# Patient Record
Sex: MALE | Race: WHITE
[De-identification: names, ages, dates, MRNs, and addresses within clinical notes are randomized per-mention and may not be internally consistent; named-entity substitution may affect disease eponyms.]

---

## 2020-06-27 ENCOUNTER — HOSPITAL ENCOUNTER (EMERGENCY)
Dept: HOSPITAL 97 - ER | Age: 20
Discharge: HOME | End: 2020-06-27
Payer: COMMERCIAL

## 2020-06-27 VITALS — DIASTOLIC BLOOD PRESSURE: 76 MMHG | SYSTOLIC BLOOD PRESSURE: 118 MMHG | OXYGEN SATURATION: 99 %

## 2020-06-27 VITALS — TEMPERATURE: 97.4 F
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
## 2020-06-27 DIAGNOSIS — S61.211A: Primary | ICD-10-CM
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
## 2020-06-27 DIAGNOSIS — Z23: ICD-10-CM

## 2020-06-27 PROCEDURE — 90471 IMMUNIZATION ADMIN: CPT

## 2020-06-27 PROCEDURE — 90714 TD VACC NO PRESV 7 YRS+ IM: CPT

## 2020-06-27 PROCEDURE — 99283 EMERGENCY DEPT VISIT LOW MDM: CPT

## 2020-06-27 PROCEDURE — 0JQK0ZZ REPAIR LEFT HAND SUBCUTANEOUS TISSUE AND FASCIA, OPEN APPROACH: ICD-10-PCS

## 2020-06-27 NOTE — XMS REPORT
Continuity of Care Document

                            Created on:2020



Patient:JACEY CORBIN

Sex:Male

:2000

External Reference #:[id number]





Demographics







                          Address                   [address] [place] [address]

 

                          Home Phone                [phone]

 

                          Email Address             NONE

 

                          Preferred Language        Unknown

 

                          Marital Status            Unknown

 

                          Yazidi Affiliation     Unknown

 

                          Race                      Unknown

 

                          Additional Race(s)        Unavailable

 

                          Ethnic Group              Unknown









Author







                          Organization              [facility]

t

 

                          Address                   [address] [place] [address] [place] [id number]

 

                          Phone                     [phone]









Care Team Providers







                    Name                Role                Phone

 

                    Unavailable         Unavailable         Unavailable









Problems

This patient has no known problems.



Allergies, Adverse Reactions, Alerts

This patient has no known allergies or adverse reactions.



Medications

This patient has no known medications.



Procedures

This patient has no known procedures.



Results

This patient has no known results.

## 2020-06-27 NOTE — ER
Nurse's Notes                                                                                     

 [facility]                                                                 

Name: Davon Gould                                                                              

Age: 20 yrs                                                                                       

Sex: Male                                                                                         

: 2000                                                                                   

MRN: [id number]                                                                                   

Arrival Date: 2020                                                                          

Time: 17:59                                                                                       

Account#: [id number]                                                                            

Bed 11                                                                                            

Private MD:                                                                                       

Diagnosis: Laceration without foreign body of left index finger without damage to nail            

                                                                                                  

Presentation:                                                                                     

                                                                                             

18:35 Chief complaint: Patient states: Cutting a corn, looked away, cut my 2nd digit of L     ca1 

      hand. Bleeding controlled. Coronavirus screen: Proceed with normal triage. Patient          

      denies a cough. Patient denies shortness of breath or difficulty breathing. Patient         

      denies measured and/or subjective temperature greater than 100.4F prior to today's          

      visit. Patient denies travel on a cruise ship or to a country the [facility] currently lists       

      as an affected area. Patient denies contact with known and/or suspected case of             

      COVID-19. Ebola Screen: Patient negative for fever greater than or equal to 101.5           

      degrees Fahrenheit, and additional compatible Ebola Virus Disease symptoms Patient          

      denies exposure to infectious person. Patient denies travel to an Ebola-affected area       

      in the 21 days before illness onset. No symptoms or risks identified at this time.          

      Initial Sepsis Screen: Does the patient meet any 2 criteria? No. Patient's initial          

      sepsis screen is negative. Does the patient have a suspected source of infection? No.       

      Patient's initial sepsis screen is negative. Risk Assessment: Do you want to hurt           

      yourself or someone else? Patient reports no desire to harm self or others. Onset of        

      symptoms was 2020.                                                                 

18:35 Method Of Arrival: Ambulatory                                                           ca1 

18:35 Acuity: CHERYL 4                                                                           ca1 

                                                                                                  

Historical:                                                                                       

- Allergies:                                                                                      

18:37 No Known Allergies;                                                                     ca1 

- Home Meds:                                                                                      

18:37 None [Active];                                                                          ca1 

- PMHx:                                                                                           

18:37 None;                                                                                   ca1 

- PSHx:                                                                                           

18:37 None;                                                                                   ca1 

                                                                                                  

- Immunization history:: Adult Immunizations up to date, Last tetanus immunization:               

  unknown.                                                                                        

- Social history:: Smoking status: Patient denies any tobacco usage or history of.                

                                                                                                  

                                                                                                  

Assessment:                                                                                       

19:38 General: Appears in no apparent distress. well groomed, well developed, well nourished, sg  

      Behavior is calm, cooperative, appropriate for age. Pain: Complains of pain in left         

      index finger Quality of pain is described as throbbing. Neuro: Level of Consciousness       

      is awake, alert, obeys commands, Oriented to person, place, time, Speech is normal,         

      Facial symmetry appears normal. Cardiovascular: Patient's skin is warm and dry. Chest       

      pain is denied. Respiratory: Airway is patent Respiratory effort is even, unlabored,        

      Respiratory pattern is regular, symmetrical. GI: No signs and/or symptoms were reported     

      involving the gastrointestinal system. : No signs and/or symptoms were reported           

      regarding the genitourinary system. EENT: No signs and/or symptoms were reported            

      regarding the EENT system. Derm: Skin is pink, warm \T\ dry. Musculoskeletal:               

      Circulation, motion, and sensation intact. Range of motion: intact in all extremities.      

      Injury Description: Laceration sustained to left index finger is clean, 0.5 to 2.5 cm       

      long, not bleeding, is bleeding a small amount.                                             

21:19 Reassessment: Patient appears in no apparent distress at this time. Patient is alert,   ca1 

      oriented x 3, equal unlabored respirations, skin warm/dry/pink.                             

                                                                                                  

Vital Signs:                                                                                      

18:35  / 61; Pulse 70; Resp 16 S; Temp 97.4(TE); Pulse Ox 98% on R/A; Weight 97.52 kg   ca1 

      (R); Height 5 ft. 10 in. (177.80 cm) (R);                                                   

21:19  / 76; Pulse 76; Resp 15 S; Pulse Ox 99% on R/A;                                  ca1 

18:35 Body Mass Index 30.85 (97.52 kg, 177.80 cm)                                             ca1 

                                                                                                  

ED Course:                                                                                        

17:59 Patient arrived in ED.                                                                  as  

18:37 Triage completed.                                                                       ca1 

18:37 Arm band placed on right wrist.                                                         ca1 

19:23 Aaron Hinson PA is PHCP.                                                                cp  

19:23 Edwin Chen MD is Attending Physician.                                           cp  

19:25 Samuel Pizarro, RN is Primary Nurse.                                                       sg  

20:55 Assist provider with laceration repair on left index finger that was between 2.6 to 7.5 ca1 

      cm using sutures. Set up tray. Performed by Aaron TORIBIO Dressed with 4X4s, Neosporin,     

      Patient tolerated well. Aluminum finger splint applied to left index finger.                

21:16 Patient did not have IV access during this emergency room visit.                        ca1 

                                                                                                  

Administered Medications:                                                                         

19:36 Drug: Lidocaine (1 %) 10 ml {Note: medication administered by Catie TORIBIO for laceration  sg  

      repair.} Volume: 20 ml; Route: Infiltration;                                                

21:00 Drug: Tetanus-Diphtheria Toxoid Adult 0.5 ml {: [employer]. Exp:         ca1 

      2022. Lot #: [id number]. } Route: IM; Site: left deltoid;                                  

                                                                                                  

                                                                                                  

Outcome:                                                                                          

20:55 Discharge ordered by MD. hill  

21:20 Discharged to home ambulatory.                                                          ca1 

21:20 Condition: stable                                                                           

21:20 Discharge instructions given to patient, Instructed on discharge instructions, follow       

      up and referral plans. wound care, Demonstrated understanding of instructions,              

      follow-up care, wound care.                                                                 

21:20 Patient left the ED.                                                                    ca1 

                                                                                                  

Signatures:                                                                                       

Samuel Pizarro, RN                         RN   Nelly Madrigal Corey, PA PA cp Acob, Cheryl RN                        RN   ca1                                                  

                                                                                                  

**************************************************************************************************

## 2020-06-27 NOTE — EDPHYS
Physician Documentation                                                                           

 [facility]                                                                 

Name: Davon Gould                                                                              

Age: 20 yrs                                                                                       

Sex: Male                                                                                         

: 2000                                                                                   

MRN: [id number]                                                                                   

Arrival Date: 2020                                                                          

Time: 17:59                                                                                       

Account#: [id number]                                                                            

Bed 11                                                                                            

Private MD:                                                                                       

ED Physician Edwin Chen                                                                    

HPI:                                                                                              

                                                                                             

19:45 This 20 yrs old  Male presents to ER via Ambulatory with complaints of         cp  

      Laceration - finger.                                                                        

19:45 The patient or guardian reports a laceration, irregular. The complaints affect the left cp  

      index finger rai side distal phalanx.                                                    

19:45 Context: The problem was sustained at home. Onset: The symptoms/episode began/occurred  cp  

      just prior to arrival. Associated signs and symptoms: Pertinent negatives: cyanosis         

      distally, decreased sensation distally.                                                     

19:45 Patient reports using knife to cut corn when he accidently cut left index finger.       cp  

                                                                                                  

Historical:                                                                                       

- Allergies:                                                                                      

18:37 No Known Allergies;                                                                     ca1 

- Home Meds:                                                                                      

18:37 None [Active];                                                                          ca1 

- PMHx:                                                                                           

18:37 None;                                                                                   ca1 

- PSHx:                                                                                           

18:37 None;                                                                                   ca1 

                                                                                                  

- Immunization history:: Adult Immunizations up to date, Last tetanus immunization:               

  unknown.                                                                                        

- Social history:: Smoking status: Patient denies any tobacco usage or history of.                

                                                                                                  

                                                                                                  

ROS:                                                                                              

19:55 Skin: Positive for laceration(s), of the left index finger.                             cp  

19:55 Constitutional: Negative for fever.                                                     cp  

19:55 Cardiovascular: Negative for chest pain.                                                cp  

19:55 Respiratory: Negative for cough.                                                            

19:55 Neuro: Negative for numbness.                                                               

19:55 All other systems are negative.                                                             

                                                                                                  

Exam:                                                                                             

20:00 Constitutional: The patient appears in no acute distress, alert, awake, well developed, cp  

      well nourished.                                                                             

20:00 Head/Face:  Normocephalic, atraumatic.                                                  cp  

20:00 Cardiovascular: Rate: normal.                                                               

20:00 Respiratory: the patient does not display signs of respiratory distress,  Respirations:     

      normal.                                                                                     

20:00 Musculoskeletal/extremity: Exam is negative for bony tenderness, ROM: full active range     

      of motion, in the left index finger, Perfusion: the extremity is normally perfused          

      throughout, Sensation intact. Tendon exam: specific tendon testing normal through           

      active and passive range of motion                                                          

20:00 Skin: injury, laceration(s), the wound is approximately  3 cm(s), of the left index         

      finger rai side distal phalanx, that can be described as clean, no foreign body,         

      irregular, with mild bleeding.                                                              

                                                                                                  

Vital Signs:                                                                                      

18:35  / 61; Pulse 70; Resp 16 S; Temp 97.4(TE); Pulse Ox 98% on R/A; Weight 97.52 kg   ca1 

      (R); Height 5 ft. 10 in. (177.80 cm) (R);                                                   

21:19  / 76; Pulse 76; Resp 15 S; Pulse Ox 99% on R/A;                                  ca1 

18:35 Body Mass Index 30.85 (97.52 kg, 177.80 cm)                                             ca1 

                                                                                                  

Laceration:                                                                                       

20:54 Wound Repair of 3cm ( 1.2in ) subcutaneous laceration to left index finger. Irregularly cp  

      shaped.. Distal neuro/vascular/tendon intact. Anesthesia: Wound infiltrated with 4 mls      

      of 1% lidocaine. Wound prep: Moderate cleansing by me, Wound irrigation by me. Skin         

      closed with 10 5-0 Prolene using simple sutures and sterile technique. Dressed with         

      Bacitracin, non-adherent dressing, finger splint. Patient tolerated well.                   

                                                                                                  

MDM:                                                                                              

19:26 Patient medically screened.                                                             cp  

20:00 Differential diagnosis: open fracture, closed fracture, simple laceration, tendon       cp  

      injury.                                                                                     

20:54 Data reviewed: vital signs, nurses notes, and as a result, I will discharge patient.    cp  

20:55 Response to treatment: the patient's symptoms have markedly improved after treatment,   cp  

      and as a result, I will discharge patient.                                                  

                                                                                                  

                                                                                             

19:27 Order name: Prolene, Sutures; Complete Time: 19:36                                      cp  

                                                                                             

19:27 Order name: Dressing - Wound; Complete Time: 19:36                                      cp  

                                                                                             

19:27 Order name: Gloves, Sterile; Complete Time: 19:36                                       cp  

                                                                                             

19:27 Order name: Setup Suture Tray; Complete Time: 19:36                                     cp  

                                                                                             

20:53 Order name: Finger Splint; Complete Time: 20:59                                         cp  

                                                                                             

20:53 Order name: Wound dressing; Complete Time: 20:59                                        cp  

                                                                                                  

Administered Medications:                                                                         

19:36 Drug: Lidocaine (1 %) 10 ml {Note: medication administered by Catie TORIBIO for laceration  sg  

      repair.} Volume: 20 ml; Route: Infiltration;                                                

21:00 Drug: Tetanus-Diphtheria Toxoid Adult 0.5 ml {: AdmitSee. Exp:         ca1 

      2022. Lot #: [id number]. } Route: IM; Site: left deltoid;                                  

                                                                                                  

                                                                                                  

Disposition:                                                                                      

21:20 Chart complete.                                                                         sergio  

                                                                                             

02:27 Co-signature as Attending Physician, Edwin Chen MD.                               ma2 

                                                                                                  

Disposition:                                                                                      

20 20:55 Discharged to Home. Impression: Laceration without foreign body of left index      

  finger without damage to nail.                                                                  

- Condition is Stable.                                                                            

- Discharge Instructions: Laceration Care, Adult.                                                 

                                                                                                  

- Medication Reconciliation Form, Thank You Letter, Antibiotic Education, Prescription            

  Opioid Use form.                                                                                

- Follow up: Private Physician; When: 7 - 10 days; Reason: Staple/Suture removal.                 

- Problem is new.                                                                                 

- Symptoms have improved.                                                                         

                                                                                                  

                                                                                                  

                                                                                                  

Signatures:                                                                                       

Samuel Pizarro RN                         Aaron Pandya PA PA cp Alzahri, Mohammad, MD MD   ma2                                                  

Radha Funez RN RN   ca1                                                  

                                                                                                  

Corrections: (The following items were deleted from the chart)                                    

                                                                                             

21:20 20:55 2020 20:55 Discharged to Home. Impression: Laceration without foreign body  ca1 

      of left index finger without damage to nail. Condition is Stable. Forms are Medication      

      Reconciliation Form, Thank You Letter, Antibiotic Education, Prescription Opioid Use.       

      Follow up: Private Physician; When: 7 - 10 days; Reason: Staple/Suture removal. Problem     

      is new. Symptoms have improved. cp                                                          

                                                                                                  

**************************************************************************************************